# Patient Record
Sex: FEMALE | Race: AMERICAN INDIAN OR ALASKA NATIVE
[De-identification: names, ages, dates, MRNs, and addresses within clinical notes are randomized per-mention and may not be internally consistent; named-entity substitution may affect disease eponyms.]

---

## 2018-07-07 ENCOUNTER — HOSPITAL ENCOUNTER (EMERGENCY)
Dept: HOSPITAL 5 - ED | Age: 28
Discharge: LEFT BEFORE DISCHARGE | End: 2018-07-07
Payer: MEDICAID

## 2018-07-07 VITALS — SYSTOLIC BLOOD PRESSURE: 94 MMHG | DIASTOLIC BLOOD PRESSURE: 45 MMHG

## 2018-07-07 DIAGNOSIS — Z3A.01: ICD-10-CM

## 2018-07-07 DIAGNOSIS — O23.41: Primary | ICD-10-CM

## 2018-07-07 DIAGNOSIS — R10.13: ICD-10-CM

## 2018-07-07 LAB
ALBUMIN SERPL-MCNC: 4.6 G/DL (ref 3.9–5)
ALT SERPL-CCNC: 10 UNITS/L (ref 7–56)
BACTERIA #/AREA URNS HPF: (no result) /HPF
BASOPHILS # (AUTO): 0 K/MM3 (ref 0–0.1)
BASOPHILS NFR BLD AUTO: 0.2 % (ref 0–1.8)
BILIRUB UR QL STRIP: (no result)
BLOOD UR QL VISUAL: (no result)
BUN SERPL-MCNC: 9 MG/DL (ref 7–17)
BUN/CREAT SERPL: 18 %
CALCIUM SERPL-MCNC: 9.8 MG/DL (ref 8.4–10.2)
EOSINOPHIL # BLD AUTO: 0 K/MM3 (ref 0–0.4)
EOSINOPHIL NFR BLD AUTO: 0 % (ref 0–4.3)
HCT VFR BLD CALC: 37.8 % (ref 30.3–42.9)
HEMOLYSIS INDEX: 5
HGB BLD-MCNC: 12.3 GM/DL (ref 10.1–14.3)
LYMPHOCYTES # BLD AUTO: 0.9 K/MM3 (ref 1.2–5.4)
LYMPHOCYTES NFR BLD AUTO: 7.6 % (ref 13.4–35)
MCH RBC QN AUTO: 26 PG (ref 28–32)
MCHC RBC AUTO-ENTMCNC: 33 % (ref 30–34)
MCV RBC AUTO: 79 FL (ref 79–97)
MONOCYTES # (AUTO): 0.4 K/MM3 (ref 0–0.8)
MONOCYTES % (AUTO): 3.5 % (ref 0–7.3)
MUCOUS THREADS #/AREA URNS HPF: (no result) /HPF
PH UR STRIP: 5 [PH] (ref 5–7)
PLATELET # BLD: 398 K/MM3 (ref 140–440)
RBC # BLD AUTO: 4.78 M/MM3 (ref 3.65–5.03)
RBC #/AREA URNS HPF: 9 /HPF (ref 0–6)
UROBILINOGEN UR-MCNC: < 2 MG/DL (ref ?–2)
WBC #/AREA URNS HPF: 17 /HPF (ref 0–6)

## 2018-07-07 PROCEDURE — 81001 URINALYSIS AUTO W/SCOPE: CPT

## 2018-07-07 PROCEDURE — 85025 COMPLETE CBC W/AUTO DIFF WBC: CPT

## 2018-07-07 PROCEDURE — 76801 OB US < 14 WKS SINGLE FETUS: CPT

## 2018-07-07 PROCEDURE — 96375 TX/PRO/DX INJ NEW DRUG ADDON: CPT

## 2018-07-07 PROCEDURE — 84702 CHORIONIC GONADOTROPIN TEST: CPT

## 2018-07-07 PROCEDURE — 76802 OB US < 14 WKS ADDL FETUS: CPT

## 2018-07-07 PROCEDURE — 96374 THER/PROPH/DIAG INJ IV PUSH: CPT

## 2018-07-07 PROCEDURE — 96361 HYDRATE IV INFUSION ADD-ON: CPT

## 2018-07-07 PROCEDURE — 81025 URINE PREGNANCY TEST: CPT

## 2018-07-07 PROCEDURE — 76817 TRANSVAGINAL US OBSTETRIC: CPT

## 2018-07-07 PROCEDURE — 99284 EMERGENCY DEPT VISIT MOD MDM: CPT

## 2018-07-07 PROCEDURE — 80053 COMPREHEN METABOLIC PANEL: CPT

## 2018-07-07 PROCEDURE — 36415 COLL VENOUS BLD VENIPUNCTURE: CPT

## 2018-07-07 NOTE — EMERGENCY DEPARTMENT REPORT
ED Abdominal Pain HPI





- General


Chief Complaint: Abdominal Pain


Stated Complaint: ABD PAIN


Time Seen by Provider: 07/07/18 02:55


Source: patient


Mode of arrival: Ambulatory


Limitations: No Limitations





- History of Present Illness


Initial Comments: 





Ronaldo is a 27 yo female brought by EMS.  Central epigastric pain at site of 

old surgical incision.  She had an unknown surgery as a child.  Severe sharp 

pain, very tender to touch.


MD Complaint: abdominal pain


-: Gradual


Location: epigastric


Radiation: back


Severity: severe


Quality: cramping


Consistency: constant


Improves With: nothing


Worsens With: nothing, movement


Associated Symptoms: nausea, vomiting





- Related Data


 Previous Rx's











 Medication  Instructions  Recorded  Last Taken  Type


 


Nitrofurantoin Macrocrystal 100 mg PO BID 10 Days #20 capsule 07/07/18 Unknown 

Rx





[Nitrofurantoin]    











 Allergies











Allergy/AdvReac Type Severity Reaction Status Date / Time


 


shellfish derived Allergy  Swelling Verified 07/07/18 01:54














ED Review of Systems


ROS: 


Stated complaint: ABD PAIN


Other details as noted in HPI





Comment: All other systems reviewed and negative


Constitutional: denies: fever, malaise


Gastrointestinal: abdominal pain, nausea, vomiting





ED Past Medical Hx





- Past Medical History


Previous Medical History?: No





- Surgical History


Past Surgical History?: No





- Social History


Smoking Status: Former Smoker


Substance Use Type: None





- Medications


Home Medications: 


 Home Medications











 Medication  Instructions  Recorded  Confirmed  Last Taken  Type


 


Nitrofurantoin Macrocrystal 100 mg PO BID 10 Days #20 capsule 07/07/18  Unknown 

Rx





[Nitrofurantoin]     














ED Physical Exam





- General


Limitations: No Limitations


General appearance: alert, in no apparent distress





- Head


Head exam: Present: atraumatic, normocephalic





- Eye


Eye exam: Present: normal appearance





- ENT


ENT exam: Present: mucous membranes moist





- Neck


Neck exam: Present: normal inspection.  Absent: tenderness, meningismus





- Respiratory


Respiratory exam: Present: normal lung sounds bilaterally.  Absent: respiratory 

distress, wheezes, rales, rhonchi





- Cardiovascular


Cardiovascular Exam: Present: regular rate, normal rhythm, normal heart sounds.

  Absent: systolic murmur, diastolic murmur, rubs, gallop





- GI/Abdominal


GI/Abdominal exam: Present: soft, tenderness, guarding, other (old central 

surgical scar).  Absent: distended, rebound





- Extremities Exam


Extremities exam: Present: normal inspection





- Back Exam


Back exam: Present: normal inspection





- Neurological Exam


Neurological exam: Present: alert, oriented X3





- Psychiatric


Psychiatric exam: Present: normal affect, normal mood





- Skin


Skin exam: Present: warm, dry, intact, normal color.  Absent: rash





ED Course


 Vital Signs











  07/07/18





  01:54


 


Temperature 97.3 F L


 


Pulse Rate 58 L


 


Respiratory 18





Rate 


 


Blood Pressure 90/50


 


O2 Sat by Pulse 100





Oximetry 














ED Medical Decision Making





- Lab Data


Result diagrams: 


 07/07/18 02:10





 07/07/18 02:10





- Radiology Data


Radiology results: report reviewed





6 week intrauterine pregnancy estimated date of conception 2/27/2019





- Medical Decision Making





Ms. Bailey presents with abdominal pain.  No evidence of peritonitis.  She 

does have new diagnosis of pregnancy.  No indication of ectopic.  She appears 

comfortable.  Ambulatory no difficulty.  I have provided nitrofurantoin for 

possible UTI.


Critical care attestation.: 


If time is entered above; I have spent that time in minutes in the direct care 

of this critically ill patient, excluding procedure time.








ED Disposition


Clinical Impression: 


 Pregnancy, Abdominal pain, UTI (urinary tract infection)





Disposition: Z-07 PAT REG,NO TRIAGE


Is pt being admited?: No


Does the pt Need Aspirin: No


Condition: Stable


Instructions:  Pregnancy (ED), Urinary Tract Infection in Women (ED)


Additional Instructions: 


Your due date is 02/27/2019


Prescriptions: 


Nitrofurantoin Macrocrystal [Nitrofurantoin] 100 mg PO BID 10 Days #20 capsule


Referrals: 


PRIMARY CARE,MD [Primary Care Provider] - 3-5 Days


VANCE JOHNSTON MD [Staff Physician] - 3-5 Days


Time of Disposition: 05:13

## 2018-07-07 NOTE — ULTRASOUND REPORT
FINAL REPORT



PROCEDURE:  US OB TRANSVAGINAL



TECHNIQUE:  Real-time transvaginal sonography of the uterus,

placenta, amniotic fluid, adnexa, and fetus was performed with

image documentation. Measurements were obtained to determine

fetal age/size. M-mode Doppler was used to document fetal

heartbeat. CPT 37989



HISTORY:  abdominal pain 



COMPARISON:  No prior studies are available for comparison.



FINDINGS:  

CRL: 6mm, which corresponds to a gestational age of: 6weeks, 3

days. 



Yolk Sac: Normal.



Embryonic Cardiac Activity: 77 beats per minute 



Gestational Sac: Normal.



Right Ovary: Normal.



Left Ovary: Normal.



Estimated delivery date: 02/27/2019



Comment: Complete anatomic survey at 18-20 weeks suggested. 



IMPRESSION:  

1. Single living intrauterine gestation at approximately 6 weeks

3 days



2. EDC by US 02/27/2019.

## 2018-07-07 NOTE — ULTRASOUND REPORT
FINAL REPORT



PROCEDURE:  US OB &lt; = 14 WK FETUS ADD GEST



TECHNIQUE:  Real-time transabdominal sonography of the uterus,

placenta, amniotic fluid, adnexa, and fetus was performed with

image documentation. Measurements were obtained to determine

fetal age/size. M-mode Doppler was used to document fetal

heartbeat. CPT 22707 



HISTORY:  abdominal pain 



COMPARISON:  No prior studies are available for comparison.



FINDINGS:  

CRL: 6 mm, which corresponds to a gestational age of: 6 weeks, 3

days. 



Yolk Sac: Normal.



Embryonic Cardiac Activity: 77 beats per minute



Gestational Sac: There is a subchorionic bleed identified. This

measures up to 13 millimeters



Amniotic fluid: Normal.



Cervix: Normal.



Right Ovary: Normal.



Left Ovary: Normal.



Estimated delivery date: 02/26/2019



Uterus and adnexa: Normal.



IMPRESSION:  

Single live intrauterine gestation at approximately 6 weeks 3

days. EDC by US 02/26/2019